# Patient Record
Sex: MALE | Race: WHITE | Employment: FULL TIME | URBAN - METROPOLITAN AREA
[De-identification: names, ages, dates, MRNs, and addresses within clinical notes are randomized per-mention and may not be internally consistent; named-entity substitution may affect disease eponyms.]

---

## 2018-10-05 ENCOUNTER — APPOINTMENT (OUTPATIENT)
Dept: GENERAL RADIOLOGY | Facility: HOSPITAL | Age: 58
End: 2018-10-05
Payer: OTHER MISCELLANEOUS

## 2018-10-05 ENCOUNTER — HOSPITAL ENCOUNTER (EMERGENCY)
Facility: HOSPITAL | Age: 58
Discharge: HOME | End: 2018-10-05
Payer: OTHER MISCELLANEOUS

## 2018-10-05 VITALS
HEART RATE: 78 BPM | DIASTOLIC BLOOD PRESSURE: 101 MMHG | WEIGHT: 185 LBS | HEIGHT: 66 IN | BODY MASS INDEX: 29.73 KG/M2 | OXYGEN SATURATION: 98 % | TEMPERATURE: 99 F | RESPIRATION RATE: 16 BRPM | SYSTOLIC BLOOD PRESSURE: 149 MMHG

## 2018-10-05 DIAGNOSIS — S90.31XA CONTUSION OF RIGHT FOOT INCLUDING TOES, INITIAL ENCOUNTER: ICD-10-CM

## 2018-10-05 DIAGNOSIS — S97.81XA CRUSHING INJURY OF RIGHT FOOT, INITIAL ENCOUNTER: ICD-10-CM

## 2018-10-05 DIAGNOSIS — S90.121A CONTUSION OF RIGHT FOOT INCLUDING TOES, INITIAL ENCOUNTER: ICD-10-CM

## 2018-10-05 DIAGNOSIS — S90.01XA CONTUSION OF RIGHT ANKLE, INITIAL ENCOUNTER: Primary | ICD-10-CM

## 2018-10-05 PROCEDURE — 73630 X-RAY EXAM OF FOOT: CPT

## 2018-10-05 PROCEDURE — 99283 EMERGENCY DEPT VISIT LOW MDM: CPT

## 2018-10-05 PROCEDURE — 73610 X-RAY EXAM OF ANKLE: CPT

## 2018-10-05 RX ORDER — HYDROCODONE BITARTRATE AND ACETAMINOPHEN 5; 325 MG/1; MG/1
1 TABLET ORAL EVERY 6 HOURS PRN
Qty: 17 TABLET | Refills: 0 | Status: SHIPPED | OUTPATIENT
Start: 2018-10-05

## 2018-10-05 RX ORDER — NAPROXEN 500 MG/1
500 TABLET ORAL 2 TIMES DAILY PRN
Qty: 30 TABLET | Refills: 0 | Status: SHIPPED | OUTPATIENT
Start: 2018-10-05 | End: 2018-10-12

## 2018-10-05 NOTE — ED PROVIDER NOTES
Patient Seen in: [facility] Emergency Department    History   Patient presents with:  Lower Extremity Injury (musculoskeletal)    Stated Complaint: crush injury to right foot. came from an immediate care. work injury. HPI    Employer:  [employer] aeration throughout. No wheezing, crackles, rales, or rhonchi. Heart: NSR, S1, S2 present. No murmurs, rubs or gallops. Lower Extremity: right ankle: +mild effusion of medial and lateral perimalleolar area  And dorsal lateral foot +TTP here.  Slightly de present over lateral malleolus. FINDINGS: No fracture or dislocation. Joint spaces are well maintained. No significant bony abnormality. IMPRESSION: Unremarkable radiographs of the right foot.     Dictated by: Linda Jenkins MD on 10/05/2018 at 16:52 tablet (500 mg total) by mouth 2 (two) times daily as needed. Qty: 30 tablet Refills: 0        I have given the patient instructions regarding his diagnosis, expectations, follow up, and return to the ER precautions.   I explained to the patient that Sal Hernandez

## 2018-10-05 NOTE — ED INITIAL ASSESSMENT (HPI)
Patient here with an  from his work. Here for a work injury that occurred at approximately 2:30 pm today. Patient states his right foot got caught in between two machines at work.  Was seen at an immediate care, and brought here for further

## 2018-11-05 PROBLEM — S97.81XD CRUSHING INJURY OF RIGHT FOOT, SUBSEQUENT ENCOUNTER: Status: ACTIVE | Noted: 2018-11-05

## 2018-12-06 PROBLEM — M79.671 RIGHT FOOT PAIN: Status: ACTIVE | Noted: 2018-12-06

## (undated) NOTE — ED AVS SNAPSHOT
Destinee Jerome   MRN: [id number]    Department:  [facility] Emergency Department   Date of Visit:  10/5/2018           Disclosure     Insurance plans vary and the physician(s) referred by the ER may not be covered by your plan.  Please contact your i tell this physician (or your personal doctor if your instructions are to return to your personal doctor) about any new or lasting problems. The primary care or specialist physician will see patients referred from the [facility] Emergency Department.  Sarah Harrington

## (undated) NOTE — LETTER
Date & Time: 10/5/2018, 5:55 PM  Patient: Gilford Najjar  Encounter Provider(s):    DILCIA Wynne       To Whom It May Concern:    Gilford Najjar was seen and treated in our department on 10/5/2018.  He should not return to work until evaluated and jak